# Patient Record
Sex: MALE | Race: WHITE | NOT HISPANIC OR LATINO | Employment: OTHER | URBAN - METROPOLITAN AREA
[De-identification: names, ages, dates, MRNs, and addresses within clinical notes are randomized per-mention and may not be internally consistent; named-entity substitution may affect disease eponyms.]

---

## 2017-04-01 ENCOUNTER — HOSPITAL ENCOUNTER (EMERGENCY)
Facility: HOSPITAL | Age: 52
Discharge: HOME | End: 2017-04-01
Attending: EMERGENCY MEDICINE
Payer: MEDICARE

## 2017-04-01 VITALS
DIASTOLIC BLOOD PRESSURE: 93 MMHG | HEIGHT: 72 IN | SYSTOLIC BLOOD PRESSURE: 141 MMHG | OXYGEN SATURATION: 97 % | HEART RATE: 90 BPM | RESPIRATION RATE: 18 BRPM | TEMPERATURE: 99 F | BODY MASS INDEX: 33.59 KG/M2 | WEIGHT: 248 LBS

## 2017-04-01 DIAGNOSIS — M79.676 TOE PAIN: ICD-10-CM

## 2017-04-01 DIAGNOSIS — M86.9 TOE OSTEOMYELITIS, RIGHT: Primary | ICD-10-CM

## 2017-04-01 PROCEDURE — 99283 EMERGENCY DEPT VISIT LOW MDM: CPT

## 2017-04-01 RX ORDER — CLOPIDOGREL BISULFATE 75 MG/1
75 TABLET ORAL DAILY
COMMUNITY

## 2017-04-01 RX ORDER — NITROGLYCERIN 0.4 MG/1
0.4 TABLET SUBLINGUAL EVERY 5 MIN PRN
COMMUNITY

## 2017-04-01 RX ORDER — METOPROLOL TARTRATE 25 MG/1
25 TABLET, FILM COATED ORAL DAILY
COMMUNITY

## 2017-04-01 RX ORDER — ATORVASTATIN CALCIUM 40 MG/1
40 TABLET, FILM COATED ORAL DAILY
COMMUNITY

## 2017-04-01 RX ORDER — ASPIRIN 325 MG
325 TABLET, DELAYED RELEASE (ENTERIC COATED) ORAL DAILY
COMMUNITY

## 2017-04-01 NOTE — DISCHARGE INSTRUCTIONS
Discharge Instructions for Osteomyelitis  You have a condition called osteomyelitis. This is a bone infection caused by bacteria or fungi. The infection may have come from one area of your body and spread to the bone by traveling through the blood. Osteomyelitis is described as acute when the infection is new, and chronic when you have had the infection for a longer time. If you have any questions or concerns, call your healthcare provider.  Home care  · Take your medication exactly as directed. If you were given antibiotics or antifungal medication, make sure you finish the prescription--even if you feel better. If you dont finish the medication, the infection may return and may make future infections harder to treat.  · Be careful not to injure the area where you have the infection.  · Carefully follow all instructions for taking care of any wounds.  · Use a splint, sling, or brace as directed by your healthcare provider.  Follow-up care  Make a follow-up appointment as directed by our staff.  When to seek medical care  Call your health care provider if you have any of the following:  · Increasing pain, redness, swelling, or drainage in the infected area  · Fever above 100.4°F (38°C)  · Increasing fatigue or feeling tired   Date Last Reviewed: 8/26/2014  © 6907-0038 [employer]. [address] [place] [address], [place] [address]. All rights reserved. This information is not intended as a substitute for professional medical care. Always follow your healthcare professional's instructions.

## 2017-04-01 NOTE — ED AVS SNAPSHOT
[facility] [address] [place] [address]               Carlos Black   2017 11:58 AM   ED    Description:  Male : 1965   Department:  [facility]           Your Care was Coordinated By:     Provider Role From To    Juan Clark MD Attending Provider 17 1221 --    Malou Mejia NP Nurse Practitioner 17 1201 --      Reason for Visit     Wound Check           Diagnoses this Visit        Comments    Toe osteomyelitis, right    -  Primary     Toe pain           ED Disposition     None           To Do List           Follow-up Information     Follow up with [facility].    Specialty:  Emergency Medicine    Why:  As needed, If symptoms worsen    Contact information:    [address] [place] [address] [phone] [phone]        Please follow up.    Why:  see dr danielle beasley for surgery as scheduled      [facility]     [facility] -  Assistance  Unless otherwise directed by your provider, please contact [facility], our nurse care line that is available for  assistance.     Registered nurses in the [facility] provide: appointment scheduling, clinical advisement, health education, and other advisory services.  Call: [phone] (toll free)               Medications           Message regarding Medications     Verify the changes and/or additions to your medication regime listed below are the same as discussed with your clinician today.  If any of these changes or additions are incorrect, please notify your healthcare provider.             Verify that the below list of medications is an accurate representation of the medications you are currently taking.  If none reported, the list may be blank. If incorrect, please contact your healthcare provider. Carry this list with you in case of emergency.           Current Medications     aspirin  (ECOTRIN) 325 MG EC tablet Take 325 mg by mouth once daily.    atorvastatin (LIPITOR) 40 MG tablet Take 40 mg by mouth once daily.    clopidogrel (PLAVIX) 75 mg tablet Take 75 mg by mouth once daily.    metoprolol tartrate (LOPRESSOR) 25 MG tablet Take 25 mg by mouth once daily.    nitroGLYCERIN (NITROSTAT) 0.4 MG SL tablet Place 0.4 mg under the tongue every 5 (five) minutes as needed for Chest pain.           Clinical Reference Information           Your Vitals Were     BP Pulse Temp Resp Height Weight    141/93 90 98.9 °F (37.2 °C) (Oral) 18 6' (1.829 m) 112.5 kg (248 lb)    SpO2 BMI             97% 33.63 kg/m2         Allergies as of 4/1/2017        Reactions    Iodine And Iodide Containing Products       Immunizations Administered on Date of Encounter - 4/1/2017     None      ED Micro, Lab, POCT     None      ED Imaging Orders     Start Ordered       Status Ordering Provider    04/01/17 1221 04/01/17 1220  X-Ray Toe 2 or more views  1 time imaging      Final result         Discharge Instructions         Discharge Instructions for Osteomyelitis  You have a condition called osteomyelitis. This is a bone infection caused by bacteria or fungi. The infection may have come from one area of your body and spread to the bone by traveling through the blood. Osteomyelitis is described as acute when the infection is new, and chronic when you have had the infection for a longer time. If you have any questions or concerns, call your healthcare provider.  Home care  · Take your medication exactly as directed. If you were given antibiotics or antifungal medication, make sure you finish the prescription--even if you feel better. If you dont finish the medication, the infection may return and may make future infections harder to treat.  · Be careful not to injure the area where you have the infection.  · Carefully follow all instructions for taking care of any wounds.  · Use a splint, sling, or brace as directed by your  healthcare provider.  Follow-up care  Make a follow-up appointment as directed by our staff.  When to seek medical care  Call your health care provider if you have any of the following:  · Increasing pain, redness, swelling, or drainage in the infected area  · Fever above 100.4°F (38°C)  · Increasing fatigue or feeling tired   Date Last Reviewed: 8/26/2014  © 8019-8824 [employer]. [address] [place] [address], [place] [address]. All rights reserved. This information is not intended as a substitute for professional medical care. Always follow your healthcare professional's instructions.          [facility] Sign-Up     Activating your [facility] account is as easy as 1-2-3!     1) Visit [email or web address], select Sign Up Now, enter this activation code and your date of birth, then select Next.  [email or web address]  Expires: 5/16/2017  1:20 PM      2) Create a username and password to use when you visit [facility] in the future and select a security question in case you lose your password and select Next.    3) Enter your e-mail address and click Sign Up!    Additional Information  If you have questions, please e-mail [email or web address] or call [phone] to talk to our [facility] staff. Remember, [facility] is NOT to be used for urgent needs. For medical emergencies, dial 911.          [facility] complies with applicable Federal civil rights laws and does not discriminate on the basis of race, color, national origin, age, disability, or sex.        Language Assistance Services     ATTENTION: Language assistance services are available, free of charge. Please call [phone].      ATENCIÓN: Si habla español, tiene a lopez disposición servicios gratuitos de asistencia lingüística. Llame al [phone].     CHÚ Ý: N?u b?n nói Ti?ng Vi?t, có các d?ch v? h? tr? ngôn ng? mi?n phí dành cho b?n. G?i s? [phone].

## 2017-04-01 NOTE — ED PROVIDER NOTES
Encounter Date: 4/1/2017       History     Chief Complaint   Patient presents with    Wound Check     diabetic toe     Review of patient's allergies indicates:   Allergen Reactions    Iodine and iodide containing products      HPI Comments: Carlos Black is a 52 year old male with pmh CAD, DM, Raynaud's disease presenting to the ED requesting a second opinion regarding his right great toe. The patient states that he was informed by his orthopedic surgeon in [place] that he had osteomyelitis and is scheduled to have a great toe amputation in three days. He is requesting an xray to determine if he truly does have osteomyelitis because he was unable to get in to see another outpatient provider and would like additional information prior to his surgery. He denies fever, change in appearance/color of toe.  Patient is on by mouth antibiotics.    The history is provided by the patient.     Past Medical History:   Diagnosis Date    Coronary artery disease     Diabetes mellitus      History reviewed. No pertinent surgical history.  History reviewed. No pertinent family history.  Social History   Substance Use Topics    Smoking status: Never Smoker    Smokeless tobacco: None    Alcohol use None     Review of Systems   Constitutional: Negative.    HENT: Negative.    Respiratory: Negative.    Cardiovascular: Negative.    Gastrointestinal: Negative.    Genitourinary: Negative.    Musculoskeletal: Positive for arthralgias (right great toe).   Skin: Positive for color change (redness (unchanged) to right great toe).   Neurological: Negative.        Physical Exam   Initial Vitals   BP Pulse Resp Temp SpO2   04/01/17 1154 04/01/17 1154 04/01/17 1154 04/01/17 1154 04/01/17 1154   141/93 90 18 98.9 °F (37.2 °C) 97 %     Physical Exam    Nursing note and vitals reviewed.  Constitutional: He appears well-developed and well-nourished. He is not diaphoretic. No distress.   HENT:   Head: Normocephalic and atraumatic.   Eyes:  Conjunctivae are normal.   Neck: Normal range of motion.   Cardiovascular: Normal rate and regular rhythm.   Pulmonary/Chest: No respiratory distress.   Musculoskeletal: Normal range of motion.   Neurological: He is alert and oriented to person, place, and time.   Skin: Skin is warm and dry.   See pictures of right great toe below   Psychiatric: He has a normal mood and affect.                     ED Course   Procedures  Labs Reviewed - No data to display          Medical Decision Making:   Clinical Tests:   Radiological Study: Ordered and Reviewed       APC / Resident Notes:   Carlos Black is a 52 year old male presenting to the ED for a second opinion of reported osteomyelitis in the right great toe. He denies any skin changes to the toe and states that it has improved over time. Xray reveals Radiographic findings which are highly suggestive of a soft tissue ulcer overlying the right great toe distal phalanx with adjacent cellulitis and osteomyelitis affecting the great toe distal phalanx.  He is scheduled for toe amputation in three days and was advised to follow up with his scheduled surgeon. He was also provided with specific return precautions. He is currently on antibiotics and does not need any additional medications at this time. Based on my clinical evaluation, I do not appreciate any immediate, emergent, or life threatening condition or etiology that warrants additional workup today and feel that the patient can be discharged with close follow up care.            Attending Attestation:     Physician Attestation Statement for NP/PA:   I have conducted a face to face encounter with this patient in addition to the NP/PA, due to Medical Complexity    Other NP/PA Attestation Additions:      Medical Decision Making: I provided a face to face evaluation of this patient.  I discussed the patient's care with Advanced Practice Clinician.  I reviewed their note and agree with the history, physical, assessment,  diagnosis, treatment, and discharge plan provided by the Advanced Practice Clinician. My overall impression is toe osteomyelitis.  The patient has been instructed to follow up with their physician or the one provided as well as specific return precautions.                    ED Course     Clinical Impression:   The primary encounter diagnosis was Toe osteomyelitis, right. A diagnosis of Toe pain was also pertinent to this visit.    Disposition:   Disposition: Discharged  Condition: Stable       Malou Mejia NP  04/01/17 2301      52-year-old presents for a second opinion regarding a possible case of osteomyelitis in his toe.  X-ray is convincing for osteomyelitis.  I have advised the patient to this into the instructions of his orthopedic surgery and have the surgery on Tuesday as planned.  Patient voices his understanding of these instructions and reports he will go ahead with the surgery.  Distal erythema on the toe but the patient reports this is unchanged.  No indication for admission for cellulitis.     Juan Clark MD  04/01/17 8795